# Patient Record
Sex: FEMALE | Race: BLACK OR AFRICAN AMERICAN | NOT HISPANIC OR LATINO | URBAN - METROPOLITAN AREA
[De-identification: names, ages, dates, MRNs, and addresses within clinical notes are randomized per-mention and may not be internally consistent; named-entity substitution may affect disease eponyms.]

---

## 2021-04-14 ENCOUNTER — EMERGENCY (EMERGENCY)
Facility: HOSPITAL | Age: 51
LOS: 1 days | Discharge: HOME | End: 2021-04-14
Attending: EMERGENCY MEDICINE
Payer: COMMERCIAL

## 2021-04-14 VITALS
RESPIRATION RATE: 18 BRPM | HEIGHT: 64 IN | DIASTOLIC BLOOD PRESSURE: 94 MMHG | OXYGEN SATURATION: 100 % | SYSTOLIC BLOOD PRESSURE: 150 MMHG | WEIGHT: 181 LBS | TEMPERATURE: 98 F | HEART RATE: 94 BPM

## 2021-04-14 VITALS
DIASTOLIC BLOOD PRESSURE: 82 MMHG | SYSTOLIC BLOOD PRESSURE: 145 MMHG | RESPIRATION RATE: 18 BRPM | OXYGEN SATURATION: 100 % | HEART RATE: 85 BPM

## 2021-04-14 PROCEDURE — 99284 EMERGENCY DEPT VISIT MOD MDM: CPT

## 2021-04-14 PROCEDURE — 96372 THER/PROPH/DIAG INJ SC/IM: CPT

## 2021-04-14 PROCEDURE — 99283 EMERGENCY DEPT VISIT LOW MDM: CPT | Mod: 25

## 2021-04-14 RX ORDER — LIDOCAINE 4 G/100G
1 CREAM TOPICAL ONCE
Refills: 0 | Status: COMPLETED | OUTPATIENT
Start: 2021-04-14 | End: 2021-04-14

## 2021-04-14 RX ORDER — CYCLOBENZAPRINE HYDROCHLORIDE 10 MG/1
10 TABLET, FILM COATED ORAL ONCE
Refills: 0 | Status: COMPLETED | OUTPATIENT
Start: 2021-04-14 | End: 2021-04-14

## 2021-04-14 RX ORDER — ACETAMINOPHEN 500 MG
975 TABLET ORAL ONCE
Refills: 0 | Status: COMPLETED | OUTPATIENT
Start: 2021-04-14 | End: 2021-04-14

## 2021-04-14 RX ORDER — OXYCODONE HYDROCHLORIDE 5 MG/1
5 TABLET ORAL ONCE
Refills: 0 | Status: DISCONTINUED | OUTPATIENT
Start: 2021-04-14 | End: 2021-04-14

## 2021-04-14 RX ORDER — OXYCODONE HYDROCHLORIDE 5 MG/1
1 TABLET ORAL
Qty: 6 | Refills: 0
Start: 2021-04-14 | End: 2021-04-15

## 2021-04-14 RX ORDER — KETOROLAC TROMETHAMINE 30 MG/ML
15 SYRINGE (ML) INJECTION ONCE
Refills: 0 | Status: DISCONTINUED | OUTPATIENT
Start: 2021-04-14 | End: 2021-04-14

## 2021-04-14 RX ORDER — KETOROLAC TROMETHAMINE 30 MG/ML
30 SYRINGE (ML) INJECTION ONCE
Refills: 0 | Status: DISCONTINUED | OUTPATIENT
Start: 2021-04-14 | End: 2021-04-14

## 2021-04-14 RX ADMIN — Medication 30 MILLIGRAM(S): at 06:57

## 2021-04-14 RX ADMIN — CYCLOBENZAPRINE HYDROCHLORIDE 10 MILLIGRAM(S): 10 TABLET, FILM COATED ORAL at 06:56

## 2021-04-14 RX ADMIN — Medication 975 MILLIGRAM(S): at 06:56

## 2021-04-14 RX ADMIN — LIDOCAINE 1 PATCH: 4 CREAM TOPICAL at 06:56

## 2021-04-14 RX ADMIN — OXYCODONE HYDROCHLORIDE 5 MILLIGRAM(S): 5 TABLET ORAL at 06:57

## 2021-04-14 NOTE — ED ADULT NURSE NOTE - NSIMPLEMENTINTERV_GEN_ALL_ED
Implemented All Fall Risk Interventions:  [place] to call system. Call bell, personal items and telephone within reach. Instruct patient to call for assistance. Room bathroom lighting operational. Non-slip footwear when patient is off stretcher. Physically safe environment: no spills, clutter or unnecessary equipment. Stretcher in lowest position, wheels locked, appropriate side rails in place. Provide visual cue, wrist band, yellow gown, etc. Monitor gait and stability. Monitor for mental status changes and reorient to person, place, and time. Review medications for side effects contributing to fall risk. Reinforce activity limits and safety measures with patient and family.

## 2021-04-14 NOTE — ED PROVIDER NOTE - ATTENDING CONTRIBUTION TO CARE
No trauma. No fever. No mid-line TTP. No LE weakness. No paresthesia No bowel/bladder incontinence. Ambulatory. Supportive care with NSAIDs, muscle relaxers, stretching and hot compress. f/u PCP/Spine for PT.

## 2021-04-14 NOTE — ED PROVIDER NOTE - PROGRESS NOTE DETAILS
Patient reports that she is feeling much better.  Ambulating without assistance.  Patient has follow up with ortho spine on 4/22.  Will DC home with strict follow up and return precautions.  -Cy Rizo PA-C

## 2021-04-14 NOTE — ED PROVIDER NOTE - CLINICAL SUMMARY MEDICAL DECISION MAKING FREE TEXT BOX
Likely spinal stenosis, no red flag signs/sxs for cord compression/epidural abscess or hematoma. Will try to control pain and set pt up with outpt spine/pain mgmt.

## 2021-04-14 NOTE — ED PROVIDER NOTE - NSFOLLOWUPINSTRUCTIONS_ED_ALL_ED_FT
1.  rest, no heavy lifting  2.  Take Tylenol 650mgs every 4-6 hrs and/or Ibuprofen 600mgs every 6 hrs as needed for mild to moderate pain.  Take Cyclobenzaprine as previously prescribed by your doctor.  Take Oxycodone 5mgs every 6-8 hrs as needed for severe pain (DO not combine with flexeril as this may caused increased drowsiness.  DO NOT drive while taking this medication)  3.  Follow up with your PMD in 2-3 days.   4.  Follow up with orthopedics on 4/22 as previously scheduled  5.  Return to the ER for worsening pain, fevers, difficulty walking, fecal/urinary incontinence, weakness, numbness or any other concerning symptoms

## 2021-04-14 NOTE — ED ADULT NURSE NOTE - OBJECTIVE STATEMENT
50 year old female c/o back pain. Pt A+Ox3, reports history of intermittent lower back pain, worsening last week, and radiating to bilateral legs. Pt denies alleviating factors, and reports laying down exacerbates the pain. Upon assessment, full ROM is present. Pt denies headache, dizziness, chest pain, SOB, N/V, abdominal pain, loss of bowel/bladder, fevers, chills, or weakness.

## 2021-04-14 NOTE — ED ADULT NURSE NOTE - NS ED NURSE DISCH DISPOSITION
Discharged Same Histology In Subsequent Stages Text: The pattern and morphology of the tumor is as described in the first stage.

## 2021-04-14 NOTE — ED PROVIDER NOTE - OBJECTIVE STATEMENT
50F no PMH, has had chronic low back pain for months to years, states it's gotten worse over the last week. Pain shoots down buttocks bilaterally and is band like to waist as well as shooting down bilat posterior upper thighs. Denies any recent trauma to the area, denies any weakness of legs, change in bowel or bladder habits, saddle anesthesia. Denies any fevers/chills, IV drug use, injections to the area. Hasn't seen a spine specialist. Has tried flexeril without much relief.

## 2021-04-14 NOTE — ED PROVIDER NOTE - PHYSICAL EXAMINATION
GENERAL: uncomfortable appearing in pain  HEENT: normal conjunctiva, oral mucosa moist  CARDIAC: regular rate and regular rhythm  PULM: clear to ascultation bilaterally, no incr wob  GI: abdomen nondistended, soft, nontender  : no CVA tenderness, no suprapubic tenderness  NEURO: alert and oriented x 3, normal speech, 5/5 strength in all large joints lower ext with pain to bilat hip flexion, able to ambulate with pain, no saddle parasthesias  MSK: no visible deformities, no peripheral edema, calf tenderness/redness/swelling, no midline focal tenderness to spine/no stepoffs  SKIN: no visible rashes  PSYCH: appropriate mood and affect

## 2021-04-14 NOTE — ED PROVIDER NOTE - PATIENT PORTAL LINK FT
You can access the FollowMyHealth Patient Portal offered by [facility] by registering at the following website: [email or web address]. By joining [email or web address]’s FollowMyHealth portal, you will also be able to view your health information using other applications (apps) compatible with our system.

## 2021-04-14 NOTE — ED ADULT NURSE REASSESSMENT NOTE - NS ED NURSE REASSESS COMMENT FT1
Report received from change of shift ANDREW Quinones. Patient resting comfortably with eyes closed, laying on back - states pain is "much better" and feels safe and comfortable going home. amble to ambulate comfortably - still with some discomfort in lower back changing positions going from laying to standing however once walking states she is OK. can call her  to come get her. . Comfort and safety provided.